# Patient Record
Sex: MALE | Race: WHITE
[De-identification: names, ages, dates, MRNs, and addresses within clinical notes are randomized per-mention and may not be internally consistent; named-entity substitution may affect disease eponyms.]

---

## 2017-02-28 ENCOUNTER — HOSPITAL ENCOUNTER (OUTPATIENT)
Dept: HOSPITAL 47 - SLEEP | Age: 67
Discharge: HOME | End: 2017-02-28
Payer: COMMERCIAL

## 2017-02-28 DIAGNOSIS — E66.01: ICD-10-CM

## 2017-02-28 DIAGNOSIS — G47.10: ICD-10-CM

## 2017-02-28 DIAGNOSIS — G47.33: Primary | ICD-10-CM

## 2017-02-28 DIAGNOSIS — E78.5: ICD-10-CM

## 2017-03-01 NOTE — PN
This is a 66-year-old male patient coming in for a yearly followup 

regarding his obstructive sleep apnea. The patient has severe disease 

with an AHI of 39 based on a sleep study that was done back in 2015 

and the patient is currently being treated with a CPAP pressure of 11 

cm of water. Over the past year or so, the patient has gained 

approximately 30 pounds and his current BMI is up to 51. He is still 

using CPAP at the pressure of 11 cm of water. His treatment remains 

successful. No snoring while on CPAP therapy. He is still averaging at 

least 8 hours of sleep at night and he is waking up alert and awake 

during the day. He is utilizing an AirFit P10 large-size nasal 

pillows. No nasal congestion. No sinus attacks. No aerophagia. No 

chest pain at nighttime. No shortness of breath. No other complaints 

otherwise.  



BP is 150/51, 67 pulse, respirations 14, temperature 98.0, saturation 

94% on room air. Hungerford score is at 9. BMI is 51. Weight is 344, 

height is 68-1/2 inches.  

GENERAL APPEARANCE: Obese, calm, comfortable. 

HEENT: Short neck, crowding of the posterior pharynx. There is no 

goiter or neck masses.  

LUNGS: Diminished breath sounds bilaterally; otherwise clear. 

Heart sounds are regular rate and rhythm. Normal S1, S2. No S3. No S4. 

No murmurs.  

ABDOMEN: Soft, nontender. No organomegaly. 

EXTREMITIES: No edema, cyanosis, or clubbing. 



IMPRESSION: 

1. Symptomatic obstructive sleep apnea with an apnea-hypopnea index of 

39. The patient is successfully being treated at a pressure of 11 cm 

of water. Clinical response has been adequate over the past one year 

despite his weight gain.  

2. Chronic hypersomnia. Current Hungerford score is at 9. 

3. Morbid obesity. Body mass index is up to 51. 

4. Hyperlipidemia. 



PLAN: 

1. Encourage weight loss. 

2. Keep the same CPAP pressure. 

3. Renew his CPAP supplies, including filters, mask, head gear and 

water chamber.  

4. Will continue to follow and make further recommendations based on 

his progress, encourage weight loss.

## 2018-06-12 ENCOUNTER — HOSPITAL ENCOUNTER (OUTPATIENT)
Dept: HOSPITAL 47 - SLEEP | Age: 68
Discharge: HOME | End: 2018-06-12
Payer: COMMERCIAL

## 2018-06-12 DIAGNOSIS — G47.33: Primary | ICD-10-CM

## 2018-06-12 DIAGNOSIS — Z99.89: ICD-10-CM

## 2018-06-12 DIAGNOSIS — E66.9: ICD-10-CM

## 2018-06-12 DIAGNOSIS — E78.5: ICD-10-CM

## 2018-06-12 NOTE — PN
PROGRESS NOTE



Mayank is 68, coming in for an annual check regarding obstructive sleep apnea.  The

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

being manufactured in [place], and he is a [place].  He [place]

long distances.  He has severe MARSHAL with an AHI of 39.  He is on CPAP and remains

compliant. He is on a CPAP pressure of 11. His weight has been stable at 337. He wears

his CPAP every night.  He is averaging more than 6.2 hours of CPAP use per night. He is

waking up refreshed and alert during the day.  He is using the AirFit P10 nose pillow,

large size.  No complaints.  No nocturnal arousals.  No sleep fragmentation.  Alert and

awake during the day.  No new-onset cardiovascular issues or problems.  No signs of any

congestive heart failure.  No altered mentation or CVA.



REVIEW OF SYSTEMS:

Twelve-point review of systems was done, and positive findings are all mentioned above

in the history of present illness.



PHYSICAL EXAMINATION:

BP is 160/78, pulse 78, respirations 14, temperature 97.7, saturation 96% on room air.

GENERAL APPEARANCE:  Calm, comfortable.  No acute distress.

Head is atraumatic, normocephalic.

NECK:  Supple.  No JVD.  No goiter or neck mass.  Mallampati class IV.

LUNGS:  Clear to auscultation.

HEART:  Sounds are regular rate and rhythm.  Normal S1, S2.  No S3, S4.  No murmurs.

ABDOMEN:  Soft, nontender.  No organomegaly.

EXTREMITIES:  No edema.  No cyanosis or clubbing.

Skin is negative for any wounds or ulceration.



IMPRESSION:

1. Symptomatic obstructive sleep apnea with an AHI of 39.  He continues to be

    successfully treated with a CPAP pressure of 11 with a C-flex of 3.  Averaging more

    than 6.2 hours of CPAP use per night.

2. Hypersomnia, recovered.

3. Obesity. Stable weight with a BMI of 50.4 and a body weight of 337.

4. Hyperlipidemia.

5. Chronic hypersomnia, improved.



PLAN:

1. Continue same pressure setting and mask interface.

2. Treatment has been in general successful.

3. Encourage weight loss.

4. Renew the medication and supplies. The patient will see me back in a year's time,

    earlier if needed.





MMODL / IJN: [id number] / Job#: [id number]